# Patient Record
Sex: MALE | Race: WHITE | NOT HISPANIC OR LATINO | URBAN - METROPOLITAN AREA
[De-identification: names, ages, dates, MRNs, and addresses within clinical notes are randomized per-mention and may not be internally consistent; named-entity substitution may affect disease eponyms.]

---
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
## 2022-02-20 ENCOUNTER — INPATIENT (INPATIENT)
Facility: HOSPITAL | Age: 22
LOS: 2 days | Discharge: HOME | End: 2022-02-23
Attending: SPECIALIST | Admitting: SPECIALIST
Payer: MEDICAID

## 2022-02-20 VITALS
DIASTOLIC BLOOD PRESSURE: 62 MMHG | RESPIRATION RATE: 18 BRPM | OXYGEN SATURATION: 98 % | HEART RATE: 121 BPM | SYSTOLIC BLOOD PRESSURE: 144 MMHG

## 2022-02-20 PROBLEM — F84.0 AUTISTIC DISORDER: Chronic | Status: ACTIVE | Noted: 2019-05-18

## 2022-02-20 LAB
ALBUMIN SERPL ELPH-MCNC: 4.8 G/DL — SIGNIFICANT CHANGE UP (ref 3.5–5.2)
ALP SERPL-CCNC: 80 U/L — SIGNIFICANT CHANGE UP (ref 30–115)
ALT FLD-CCNC: 47 U/L — HIGH (ref 13–38)
ANION GAP SERPL CALC-SCNC: 14 MMOL/L — SIGNIFICANT CHANGE UP (ref 7–14)
AST SERPL-CCNC: 27 U/L — SIGNIFICANT CHANGE UP (ref 13–38)
BASOPHILS # BLD AUTO: 0.05 K/UL — SIGNIFICANT CHANGE UP (ref 0–0.2)
BASOPHILS NFR BLD AUTO: 0.4 % — SIGNIFICANT CHANGE UP (ref 0–1)
BILIRUB SERPL-MCNC: 0.3 MG/DL — SIGNIFICANT CHANGE UP (ref 0.2–1.2)
BUN SERPL-MCNC: 14 MG/DL — SIGNIFICANT CHANGE UP (ref 10–20)
CALCIUM SERPL-MCNC: 9.4 MG/DL — SIGNIFICANT CHANGE UP (ref 8.5–10.1)
CHLORIDE SERPL-SCNC: 105 MMOL/L — SIGNIFICANT CHANGE UP (ref 98–110)
CO2 SERPL-SCNC: 19 MMOL/L — SIGNIFICANT CHANGE UP (ref 17–32)
CREAT SERPL-MCNC: 0.7 MG/DL — SIGNIFICANT CHANGE UP (ref 0.3–1)
EOSINOPHIL # BLD AUTO: 0.21 K/UL — SIGNIFICANT CHANGE UP (ref 0–0.7)
EOSINOPHIL NFR BLD AUTO: 1.5 % — SIGNIFICANT CHANGE UP (ref 0–8)
GLUCOSE SERPL-MCNC: 109 MG/DL — HIGH (ref 70–99)
HCT VFR BLD CALC: 45.2 % — SIGNIFICANT CHANGE UP (ref 42–52)
HGB BLD-MCNC: 15.6 G/DL — SIGNIFICANT CHANGE UP (ref 14–18)
IMM GRANULOCYTES NFR BLD AUTO: 0.5 % — HIGH (ref 0.1–0.3)
LYMPHOCYTES # BLD AUTO: 1.57 K/UL — SIGNIFICANT CHANGE UP (ref 1.2–3.4)
LYMPHOCYTES # BLD AUTO: 11.3 % — LOW (ref 20.5–51.1)
MAGNESIUM SERPL-MCNC: 2.1 MG/DL — SIGNIFICANT CHANGE UP (ref 1.8–2.4)
MCHC RBC-ENTMCNC: 28.6 PG — SIGNIFICANT CHANGE UP (ref 27–31)
MCHC RBC-ENTMCNC: 34.5 G/DL — SIGNIFICANT CHANGE UP (ref 32–37)
MCV RBC AUTO: 82.8 FL — SIGNIFICANT CHANGE UP (ref 80–94)
MONOCYTES # BLD AUTO: 0.94 K/UL — HIGH (ref 0.1–0.6)
MONOCYTES NFR BLD AUTO: 6.7 % — SIGNIFICANT CHANGE UP (ref 1.7–9.3)
NEUTROPHILS # BLD AUTO: 11.11 K/UL — HIGH (ref 1.4–6.5)
NEUTROPHILS NFR BLD AUTO: 79.6 % — HIGH (ref 42.2–75.2)
NRBC # BLD: 0 /100 WBCS — SIGNIFICANT CHANGE UP (ref 0–0)
PLATELET # BLD AUTO: 231 K/UL — SIGNIFICANT CHANGE UP (ref 130–400)
POTASSIUM SERPL-MCNC: 4.1 MMOL/L — SIGNIFICANT CHANGE UP (ref 3.5–5)
POTASSIUM SERPL-SCNC: 4.1 MMOL/L — SIGNIFICANT CHANGE UP (ref 3.5–5)
PROT SERPL-MCNC: 7 G/DL — SIGNIFICANT CHANGE UP (ref 6.1–8)
RBC # BLD: 5.46 M/UL — SIGNIFICANT CHANGE UP (ref 4.7–6.1)
RBC # FLD: 12.3 % — SIGNIFICANT CHANGE UP (ref 11.5–14.5)
SARS-COV-2 RNA SPEC QL NAA+PROBE: SIGNIFICANT CHANGE UP
SODIUM SERPL-SCNC: 138 MMOL/L — SIGNIFICANT CHANGE UP (ref 135–146)
WBC # BLD: 13.95 K/UL — HIGH (ref 4.8–10.8)
WBC # FLD AUTO: 13.95 K/UL — HIGH (ref 4.8–10.8)

## 2022-02-20 PROCEDURE — 93010 ELECTROCARDIOGRAM REPORT: CPT

## 2022-02-20 PROCEDURE — 99285 EMERGENCY DEPT VISIT HI MDM: CPT

## 2022-02-20 RX ORDER — SODIUM CHLORIDE 9 MG/ML
3 INJECTION INTRAMUSCULAR; INTRAVENOUS; SUBCUTANEOUS EVERY 8 HOURS
Refills: 0 | Status: DISCONTINUED | OUTPATIENT
Start: 2022-02-20 | End: 2022-02-23

## 2022-02-20 RX ADMIN — SODIUM CHLORIDE 3 MILLILITER(S): 9 INJECTION INTRAMUSCULAR; INTRAVENOUS; SUBCUTANEOUS at 22:30

## 2022-02-20 NOTE — ED PROVIDER NOTE - PROGRESS NOTE DETAILS
Spoke with Dr. Go who agrees with the plan to admit the patient for further evaluation. Spoke with peds resident who is aware of this patient

## 2022-02-20 NOTE — H&P PEDIATRIC - NSHPLABSRESULTS_GEN_ALL_CORE
Labs:  CBC Full  -  ( 20 Feb 2022 19:00 )  WBC Count : 13.95 K/uL  RBC Count : 5.46 M/uL  Hemoglobin : 15.6 g/dL  Hematocrit : 45.2 %  Platelet Count - Automated : 231 K/uL  Mean Cell Volume : 82.8 fL  Mean Cell Hemoglobin : 28.6 pg  Mean Cell Hemoglobin Concentration : 34.5 g/dL  Auto Neutrophil # : 11.11 K/uL  Auto Lymphocyte # : 1.57 K/uL  Auto Monocyte # : 0.94 K/uL  Auto Eosinophil # : 0.21 K/uL  Auto Basophil # : 0.05 K/uL  Auto Neutrophil % : 79.6 %  Auto Lymphocyte % : 11.3 %  Auto Monocyte % : 6.7 %  Auto Eosinophil % : 1.5 %  Auto Basophil % : 0.4 %      02-20    138  |  105  |  14  ----------------------------<  109<H>  4.1   |  19  |  0.7    Ca    9.4      20 Feb 2022 19:00  Mg     2.1     02-20    TPro  7.0  /  Alb  4.8  /  TBili  0.3  /  DBili  x   /  AST  27  /  ALT  47<H>  /  AlkPhos  80  02-20    LIVER FUNCTIONS - ( 20 Feb 2022 19:00 )  Alb: 4.8 g/dL / Pro: 7.0 g/dL / ALK PHOS: 80 U/L / ALT: 47 U/L / AST: 27 U/L / GGT: x

## 2022-02-20 NOTE — ED PEDIATRIC NURSE NOTE - LOW RISK FALLS INTERVENTIONS (SCORE 7-11)
Orientation to room/Environment clear of unused equipment, furniture's in place, clear of hazards/Patient and family education available to parents and patient/Document fall prevention teaching and include in plan of care

## 2022-02-20 NOTE — H&P PEDIATRIC - ASSESSMENT
21 year old M with ASD and 1 prior seizure episode in May 2019, presenting with tonic clonic seizure-like activity, admitted for VEEG to rule out seizures. Patient has stable vitals. PE unremarkable. Patient is at baseline, minimally verbal. Labs reveal mild leukocytosis, secondary to seizure activity. Will admit for VEEG to assess for source of seizure activity.     Plan:   RESP:  RA    CVS:  Stable    FENGI:  Regular diet  IV lock and flush    ID:  COVID neg    NEURO:  VEEG  Ativan 2 mg IV PRN for seizures > 5 min  seizure precautions 21 year old M with ASD and 1 prior seizure episode in May 2019, presenting with tonic clonic seizure-like activity, admitted for VEEG to rule out seizures. Patient has stable vitals. PE unremarkable. Patient is at baseline, minimally verbal. Labs reveal mild leukocytosis likely secondary to seizure activity. Will admit for VEEG to assess for source of seizure activity.     Plan:   RESP:  RA    CVS:  Stable    FENGI:  Regular diet  IV lock and flush    ID:  COVID neg    NEURO:  VEEG  Ativan 2 mg IV PRN for seizures > 5 min  seizure precautions

## 2022-02-20 NOTE — ED PEDIATRIC TRIAGE NOTE - CHIEF COMPLAINT QUOTE
Patient BIBEMS for witnessed seizure at home, as per father seizure lasted 3 mins and he administered PO 1 MG Klonopin, no active seizures noted. Patient nonverbal at baseline. Denies head trauma or injury. Patient has had one prior seizure in 2019.

## 2022-02-20 NOTE — PATIENT PROFILE PEDIATRIC - ALCOHOL USE HISTORY SINGLE SELECT
Left message for patient to return call.   Future lab orders and US order placed.    Levothyroxine order awaiting pharmacy choice once patient returns call.    never

## 2022-02-20 NOTE — ED PROVIDER NOTE - NS ED ROS FT
Constitutional: Negative for fever, chills, and fatigue.  HENT: Negative for headache  Cardiovascular: Negative for chest pain, and palpitation.  Respiratory: Negative for SOB, wheezing, cough and sputum production.  Gastrointestinal: Negative for nausea, vomiting, abdominal pain, constipation, diarrhea, hematochezia, and melena.  Neurological: + seizure. Negative for dizziness  Musculoskeletal: Negative for joint swelling, arthralgias, back pain, neck pain, and calf cramps.

## 2022-02-20 NOTE — ED PROVIDER NOTE - OBJECTIVE STATEMENT
22 y/o male with hx of autism and seizure activity who presents with seizure activity. Pt had seizure in June 2018 and was seen by a neurologist at [facility] and had series of EEP which were unremarkable. Pt was not placed on any seizure meds and father was given Klonopin for break through seizure. Pt never had seizure again until today. Per father, pt was playing computer games and started having convulsive seizure. Dad gave Klonopin and seizure stopped shortly. Per dad, pt seized for a total of 3 mins. Denies recent sickness, head trauma, loss of bladder or bowel control, trauma during seizure, tongue biting, N/V, abdominal pain, and urinary symptoms.

## 2022-02-20 NOTE — ED PROVIDER NOTE - ATTENDING CONTRIBUTION TO CARE
21-year-old male past medical history of autism nonverbal but follows commands, seizure in 2018 but not currently on medication, presents with seizure.  Witnessed by father who is at the bedside.  Patient developed stiffness shaking to entire body jerking lasted approximately 3 minutes, resolved when father gave 1 mg clonazepam oral disintegrating tablet.  Patient is now sleepy but awake.  No recent illness nausea vomiting fever cough.  Has been tolerating p.o. no recent trauma.  Was never started on antiepileptic medications follows with [facility] for neurology, has had EEGs in the past and negative head CT in 2018.    On exam, AFVSS, Well appearing, No acute distress, NCAT, EOMI, PERRLA, MMM, Neck supple, LCTAB, RRR nl s1s2 No mrg, Abdomen Soft NTND, alert follows commands, moving all extremities cranial nerves intact, no Focal Deficits, No LE edema or calf TTP,    A/P; status post seizure, initially postictal but now back to baseline, will get fingerstick EKG and neurology consult

## 2022-02-20 NOTE — ED PROVIDER NOTE - CLINICAL SUMMARY MEDICAL DECISION MAKING FREE TEXT BOX
21-year-old male with a history of autism and seizure history presents to the ED with an episode of seizure.  Patient was evaluated by the initial team and case was signed out to me pending final disposition to neurology.  Unremarkable physical exam.  ANO x2 which is baseline.  Labs reviewed by me, values noted to be within normal limits.  Admitted to neurology.

## 2022-02-20 NOTE — ED PROVIDER NOTE - PHYSICAL EXAMINATION
CONSTITUTIONAL: In no apparent distress.   HEAD: Normocephalic; atraumatic.   EYES: Pupils are round and reactive, extra-ocular muscles are intact. Eyelids are normal in appearance without swelling or lesions.   ENT: Hearing is intact with good acuity to spoken voice.    RESPIRATORY: No signs of respiratory distress. Lung sounds are clear in all lobes bilaterally without rales, rhonchi, or wheezes.  CARDIOVASCULAR: Regular rate and rhythm.   GI: Abdomen is soft, non-tender, and without distention. Bowel sounds are present and normoactive in all four quadrants. No masses are noted.   BACK: No evidence of trauma or deformity. No midline tenderness. Normal ROM.   EXT: Normal appearance and ROM in all four extremities. No tenderness to palpation and distal pulses are normal. Sensation to the upper and lower extremities is normal bilaterally.   NEURO: A & O x 2. Visual fields are full to confrontation. Pupils are equally reactive to light. Extraocular movement is intact. There is no eye deviation. Facial sensation is intact to light touch. Face is symmetric with normal eye closure and smile. Hearing is normal to spoken voice. Phonation is normal.

## 2022-02-20 NOTE — H&P PEDIATRIC - HISTORY OF PRESENT ILLNESS
STEWART ARGUELLO    HPI.     Review of Systems  Constitutional: (-) fever (-) weakness (-) diaphoresis (-) pain  Eyes: (-) change in vision (-) photophobia (-) eye pain  ENT: (-) sore throat (-) ear pain  (-) nasal discharge (-) congestion  Cardiovascular: (-) chest pain (-) palpitations  Respiratory: (-) SOB (-) cough (-) WOB   GI: (-) abdominal pain (-) nausea (-) vomiting (-) diarrhea (-) constipation  : (-) dysuria (-) hematuria (-) increased frequency (-) increased urgency  Integumentary: (-) rash (-) redness (-) joint pain (-) MSK pain (-) swelling  Neurological:  (-) focal deficit (-) altered mental status (-) dizziness  General: (-) recent travel (-) sick contacts (-) decreased PO (-) urine output     PMHx:   PSHx:   Meds:   All: NKDA   FHx:   SHx:   HEADSSS: ---- For Adolescent Pt   - Home:   - Education/Employment:  - Activities:  - Drugs:  - Sexuality:  - Suicide/Depression:  - Safety:  BHx: FT, , no NICU stay, no complications  DHx: developmentally appropriate, rising ___ grader, academically performing well. ST/OT/PT  PMD:   Vaccines:   Rx:     ED Course: Fluids and Meds, Labs, Imaging, Consults    Vital Signs Last 24 Hrs  T(C): 37 (2022 21:09), Max: 37 (2022 17:56)  T(F): 98.6 (2022 21:09), Max: 98.6 (2022 17:56)  HR: 96 (2022 21:09) (96 - 121)  BP: 119/59 (2022 21:09) (119/59 - 144/62)  BP(mean): 83 (2022 21:09) (83 - 83)  RR: 19 (2022 21:09) (18 - 19)  SpO2: 98% (2022 21:09) (98% - 99%)    I&O's Summary      Drug Dosing Weight      Physical Exam:  GENERAL: well-appearing, well nourished, no acute distress, AOx3  HEENT: NCAT, conjunctiva clear and not injected, sclera non-icteric, PERRLA, EACs clear, TMs nonbulging/nonerythematous, nares patent, mucous membranes moist, no mucosal lesions, pharynx nonerythematous, no tonsillar hypertrophy or exudate, neck supple, no cervical lymphadenopathy  HEART: RRR, S1, S2, no rubs, murmurs, or gallops, RP/DP present, cap refill <2 seconds  LUNG: CTAB, no wheezing, no ronchi, no crackles, no retractions, no belly breathing, no tachypnea  ABDOMEN: +BS, soft, nontender, nondistended, no hepatomegaly, no splenomegaly, no hernia  NEURO/MSK: grossly intact  NEURO: CNII-XII grossly intact, EOMI, no dysmetria, DTRs normal b/l, no ataxia, sensation intact to light touch, negative Babinski  MUSCULOSKELETAL: passive and active ROM intact, 5/5 strength upper extremities  SKIN: good turgor, no rash, no bruising or prominent lesions                         STEWART ARGUELLO    21 year old M with ASD and 1 prior seizure episode in May 2019, presenting with tonic-clonic seizure-like activity. This afternoon, the patient was sitting at his computer when he let out a scream. Father witnessed patient arching his back and tensing up. His arms were flexed at the elbow. He then began to have full body shaking, although father said he could not see his legs since he was sitting. He had foaming at the mouth. Denies head trauma, tongue-biting, incontinence or defecation. The seizure lasted approximately 3 minutes. Father gave 1 mg of Clonazepam which he had at home, since the prior seizure. Patient returned to baseline after 10-15 minutes, apart from some grogginess. Patient had prior seizure in May 2019 where he fell from his chair. Father thought the patient was choking at the time and had put his finger in the patient's mouth; patient bit down on father's finger. He also tensed up and then had full body shaking during the prior episode. Patient had a longer post ictal state in the prior episode than this time. Since the prior seizure episode, patient was followed by Neurologist Dr. Lama at [facility]; last seen 2021. He has had multiple prior EEGs that were normal with [facility]. Denies recent URI, fever or sick contacts.     PMHx: ASD, 1 prior seizure episode in May 2019  PSHx: Circumcision  Meds: Clonazepam 1 mg PRN for seizures > 3 min  All: NKDA   FHx: 2 brothers have Autism  SHx: Lives with parents and 2 brothers. Father smokes cigars. No pets.   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   DHx: Autism Spectrum Disorder. FRANCO/ST/OT/PT  PMD: Dr. Ned Gonzalez  Vaccines: UTD including COVID+booster; no flu this year    ED Course: CBC, CMP, COVID PCR, Magnesium, POCT glucose                 STEWART ARGUELLO    21 year old M with ASD and 1 prior seizure episode in May 2019, presenting with tonic-clonic seizure-like activity. This afternoon, the patient was sitting at his computer when he let out a scream. Father witnessed patient arching his back and tensing up. His arms were flexed at the elbow. He then began to have full body shaking, although father said he could not see his legs since he was sitting. He had foaming at the mouth. Denies head trauma, tongue-biting, incontinence or defecation. The seizure lasted approximately 3 minutes. Father gave 1 mg of Clonazepam which was prescribed as rescue treatment following the prior seizure. Patient returned to baseline after 10-15 minutes, apart from some grogginess. Patient had prior seizure in May 2019 where he fell from his chair. Father thought the patient was choking at the time and had put his finger in the patient's mouth; patient bit down on father's finger. He also tensed up and then had full body shaking during the prior episode. Patient had a longer post ictal state in the prior episode than this time. VEEG at that time done at [facility] was normal overnight. Patient was followed by Neurologist Dr. Lama at [facility]; last seen 2021. Subsequent EEGs were normal with [facility].     ROS: Denies recent URI, fever or sick contacts.     PMHx: ASD, 1 prior seizure episode in May 2019  PSHx: Circumcision  Meds: Clonazepam 1 mg PRN for seizures > 3 min  All: NKDA   FHx: 2 brothers have Autism  SHx: Lives with parents and 2 brothers. Father smokes cigars. No pets.   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   DHx: Autism Spectrum Disorder. FRANCO/ST/OT/PT  PMD: Dr. Ned Gonzalez  Vaccines: UTD including COVID+booster; no flu this year    ED Course: CBC, CMP, COVID PCR, Magnesium, POCT glucose

## 2022-02-20 NOTE — H&P PEDIATRIC - NSHPPHYSICALEXAM_GEN_ALL_CORE
Vital Signs Last 24 Hrs  T(C): 37 (20 Feb 2022 21:09), Max: 37 (20 Feb 2022 17:56)  T(F): 98.6 (20 Feb 2022 21:09), Max: 98.6 (20 Feb 2022 17:56)  HR: 96 (20 Feb 2022 21:09) (96 - 121)  BP: 119/59 (20 Feb 2022 21:09) (119/59 - 144/62)  BP(mean): 83 (20 Feb 2022 21:09) (83 - 83)  RR: 19 (20 Feb 2022 21:09) (18 - 19)  SpO2: 98% (20 Feb 2022 21:09) (98% - 99%)    Physical Exam:  GENERAL: well-appearing, well nourished, no acute distress, minimally verbal  HEENT: NCAT, conjunctiva clear and not injected, sclera non-icteric, PERRLA, EACs clear  HEART: RRR, S1, S2, no rubs, murmurs, or gallops, RP/DP present, cap refill <2 seconds  LUNG: CTAB, no wheezing, no ronchi, no crackles, no retractions, no belly breathing, no tachypnea  ABDOMEN: soft, nondistended  NEURO/MSK: grossly intact  NEURO: EOMI; unable to assess sensation as patient is minimally verbal and autistic  MUSCULOSKELETAL: passive and active ROM intact, 5/5 strength upper extremities  SKIN: good turgor, no rash, no bruising or prominent lesions Vital Signs Last 24 Hrs  T(C): 37 (20 Feb 2022 21:09), Max: 37 (20 Feb 2022 17:56)  T(F): 98.6 (20 Feb 2022 21:09), Max: 98.6 (20 Feb 2022 17:56)  HR: 96 (20 Feb 2022 21:09) (96 - 121)  BP: 119/59 (20 Feb 2022 21:09) (119/59 - 144/62)  BP(mean): 83 (20 Feb 2022 21:09) (83 - 83)  RR: 19 (20 Feb 2022 21:09) (18 - 19)  SpO2: 98% (20 Feb 2022 21:09) (98% - 99%)    Physical Exam:  GENERAL: well-appearing, well nourished, no acute distress, minimally verbal  HEENT: NCAT, conjunctiva clear and not injected, sclera non-icteric, PERRLA, EACs clear  HEART: RRR, S1, S2, no rubs, murmurs, or gallops, RP/DP present, cap refill <2 seconds  LUNG: CTAB, no wheezing, no ronchi, no crackles, no retractions, no belly breathing, no tachypnea  ABDOMEN: soft, nondistended  NEURO: CN II-XII in tact. No nystagmus  MUSCULOSKELETAL: passive and active ROM intact, 5/5 strength upper extremities  SKIN: good turgor, no rash, no bruising or prominent lesions

## 2022-02-21 PROCEDURE — 99222 1ST HOSP IP/OBS MODERATE 55: CPT

## 2022-02-21 RX ADMIN — SODIUM CHLORIDE 3 MILLILITER(S): 9 INJECTION INTRAMUSCULAR; INTRAVENOUS; SUBCUTANEOUS at 22:00

## 2022-02-21 RX ADMIN — SODIUM CHLORIDE 3 MILLILITER(S): 9 INJECTION INTRAMUSCULAR; INTRAVENOUS; SUBCUTANEOUS at 05:10

## 2022-02-21 RX ADMIN — SODIUM CHLORIDE 3 MILLILITER(S): 9 INJECTION INTRAMUSCULAR; INTRAVENOUS; SUBCUTANEOUS at 13:21

## 2022-02-21 NOTE — CONSULT NOTE PEDS - SUBJECTIVE AND OBJECTIVE BOX
21 year old M  one of the triplets that admitted for a witnessed GTC seizure.  One of the triplets is her sister with normal development. The other boy of the same triplet pregnancy and also the boy born a year later are also carrying the diagnosis of Autistic spectrum disorder .  Mitul was diagnosed from age [age] years. He was under the care of Oliverio Teixeira .  He did have  one  prior seizure episode in May 2019, presenting with tonic-clonic seizure-like activity.  After that he was seen in [facility] by Albin Teixeira. According   to Dad three sessions of V-EEG were all normal. He did not have MRI and he was also not started on AED.  This event happened in the  afternoon, the patient was sitting at his computer when he let out a scream. Father witnessed patient arching / having flexion of both hands and eyes rolling up. No head or eye turn. This was followed by clonic activity lasting for about two minutes. It took him about 10 minutes to go back to the baseline  . Dad denies seeing any post ictal neurological deficit . He had foaming at the mouth. Denies head trauma, tongue-biting, incontinence or defecation.   Prior to the event he was at his usual daily activities. His sleep is described as good   At baseline he does have very limited vocabulary He participates in different programs and his behavior is described as calm. He is not having any episodic events in sleep or wakefulness        Physical Exam: Vital Signs Last 24 Hrs  T(C): 37 (20 Feb 2022 21:09), Max: 37 (20 Feb 2022 17:56)  T(F): 98.6 (20 Feb 2022 21:09), Max: 98.6 (20 Feb 2022 17:56)  HR: 96 (20 Feb 2022 21:09) (96 - 121)  BP: 119/59 (20 Feb 2022 21:09) (119/59 - 144/62)  BP(mean): 83 (20 Feb 2022 21:09) (83 - 83)  RR: 19 (20 Feb 2022 21:09) (18 - 19)  SpO2: 98% (20 Feb 2022 21:09) (98% - 99%)    Physical Exam:  GENERAL: well-appearing, well nourished, no acute distress, minimally verbal  HEENT: NCAT, conjunctiva clear and not injected, sclera non-icteric, PERRLA, EACs clear  HEART: RRR, S1, S2, no rubs, murmurs, or gallops, RP/DP present, cap refill <2 seconds  LUNG: CTAB, no wheezing, no ronchi, no crackles, no retractions, no belly breathing, no tachypnea  ABDOMEN: soft, nondistended  NEURO   decreased eye contact, playing with his computer, having some self stim behavior such as flapping the hands . Full ROM of EOM. Moves extermities symmetrically    MUSCULOSKELETAL: passive and active ROM intact, 5/5 strength upper extremities  SKIN: good turgor, no rash, no bruising or prominent lesions

## 2022-02-21 NOTE — CONSULT NOTE PEDS - ASSESSMENT
1- Autistic spectrum D/O ( no clear defined etiology for the syndrome )  2- two episdes of GTC seizure since 2019      Plan  V-EEG  will consider MRI  3- no AED  4- seizure precaution

## 2022-02-21 NOTE — PROGRESS NOTE PEDS - SUBJECTIVE AND OBJECTIVE BOX
Patient is a 21y old  Male who presents with a chief complaint of seizure-like activity (20 Feb 2022 21:28)      INTERVAL/OVERNIGHT EVENTS: Patient seen and examined at bedside. No acute overnight events. Patient is voiding and stooling adequately.    REVIEW OF SYSTEMS:   CONSTITUTIONAL: No fevers, no chills, no irritability, no decrease in activity.  HEAD: No headache  EYES/ENT: No eye discharge, no throat pain, no nasal congestion, no rhinorrhea, no otalgia.  NECK: No pain, no stiffness  RESPIRATORY: No cough, no wheezing, no increase work of breathing, no shortness of breath.  CARDIOVASCULAR: No chest pain, no palpitations.  GASTROINTESTINAL: No abdominal pain. No nausea, no vomiting. No diarrhea, no constipation. No decrease appetite. No hematemesis. No melena or hematochezia.  GENITOURINARY: No dysuria, frequency or hematuria.   NEUROLOGICAL: No numbness, no weakness.  SKIN: No itching, no rash.    VITALS, INTAKE/OUTPUT:  Vital Signs Last 24 Hrs  T(C): 36.4 (20 Feb 2022 22:33), Max: 37 (20 Feb 2022 17:56)  T(F): 97.5 (20 Feb 2022 22:33), Max: 98.6 (20 Feb 2022 17:56)  HR: 86 (20 Feb 2022 22:33) (86 - 121)  BP: 129/72 (20 Feb 2022 22:33) (119/59 - 144/62)  BP(mean): 83 (20 Feb 2022 21:09) (83 - 83)  RR: 24 (20 Feb 2022 22:33) (18 - 24)  SpO2: 98% (20 Feb 2022 22:33) (98% - 99%)  Daily     Daily   I&O's Summary      PHYSICAL EXAM:  Gen: No acute distress; interactive, well appearing  HEENT: NC/AT; no conjunctivitis or scleral icterus; no nasal discharge; oropharynx without exudates/erythema; mucus membranes moist  Neck: Supple, no cervical lymphadenopathy  Chest: CTA b/l, no crackles/wheezes, no tachypnea or retractions. Cap refill < 2 seconds  CV: RRR, no m/r/g  Abd: Normoactive bowel sounds, soft, nondistended, nontender, no hepatosplenomegaly  Extrem: No deformities, edema or erythema noted.  WWP  Neuro: Grossly nonfocal, strength and tone grossly normal,     INTERVAL LAB RESULTS:                        15.6   13.95 )-----------( 231      ( 20 Feb 2022 19:00 )             45.2                               138    |  105    |  14                  Calcium: 9.4   / iCa: x      (02-20 @ 19:00)    ----------------------------<  109       Magnesium: 2.1                              4.1     |  19     |  0.7              Phosphorous: x        TPro  7.0    /  Alb  4.8    /  TBili  0.3    /  DBili  x      /  AST  27     /  ALT  47     /  AlkPhos  80     20 Feb 2022 19:00      UCx   I&O's Summary      Medications and Allergies:  MEDICATIONS  (STANDING):  sodium chloride 0.9% lock flush 3 milliLiter(s) IV Push every 8 hours    MEDICATIONS  (PRN):  LORazepam IV Push - Peds 2 milliGRAM(s) IV Push once PRN seizure > 5 min    Allergies    No Known Allergies    Intolerances        Assessment:    Plan: Patient is a 21y old  Male who presents with a chief complaint of seizure-like activity (20 Feb 2022 21:28)      INTERVAL/OVERNIGHT EVENTS: Patient seen and examined at bedside. No acute overnight events. Patient is voiding and stooling adequately.    REVIEW OF SYSTEMS:   CONSTITUTIONAL: No fevers, no chills, no irritability, no decrease in activity.  HEAD: No headache  EYES/ENT: No eye discharge, no throat pain, no nasal congestion, no rhinorrhea, no otalgia.  NECK: No pain, no stiffness  RESPIRATORY: No cough, no wheezing, no increase work of breathing, no shortness of breath.  CARDIOVASCULAR: No chest pain, no palpitations.  GASTROINTESTINAL: No abdominal pain. No nausea, no vomiting. No diarrhea, no constipation. No decrease appetite. No hematemesis. No melena or hematochezia.  GENITOURINARY: No dysuria, frequency or hematuria.   NEUROLOGICAL: No numbness, no weakness.  SKIN: No itching, no rash.    VITALS, INTAKE/OUTPUT:  Vital Signs Last 24 Hrs  T(C): 36.4 (20 Feb 2022 22:33), Max: 37 (20 Feb 2022 17:56)  T(F): 97.5 (20 Feb 2022 22:33), Max: 98.6 (20 Feb 2022 17:56)  HR: 86 (20 Feb 2022 22:33) (86 - 121)  BP: 129/72 (20 Feb 2022 22:33) (119/59 - 144/62)  BP(mean): 83 (20 Feb 2022 21:09) (83 - 83)  RR: 24 (20 Feb 2022 22:33) (18 - 24)  SpO2: 98% (20 Feb 2022 22:33) (98% - 99%)  Daily     Daily   I&O's Summary      PHYSICAL EXAM:  Gen: No acute distress; interactive, well appearing  HEENT: NC/AT; no conjunctivitis or scleral icterus; no nasal discharge; oropharynx without exudates/erythema; mucus membranes moist  Neck: Supple, no cervical lymphadenopathy  Chest: CTA b/l, no crackles/wheezes, no tachypnea or retractions. Cap refill < 2 seconds  CV: RRR, no m/r/g  Abd: Normoactive bowel sounds, soft, nondistended, nontender, no hepatosplenomegaly  Extrem: No deformities, edema or erythema noted.      INTERVAL LAB RESULTS:                        15.6   13.95 )-----------( 231      ( 20 Feb 2022 19:00 )             45.2                               138    |  105    |  14                  Calcium: 9.4   / iCa: x      (02-20 @ 19:00)    ----------------------------<  109       Magnesium: 2.1                              4.1     |  19     |  0.7              Phosphorous: x        TPro  7.0    /  Alb  4.8    /  TBili  0.3    /  DBili  x      /  AST  27     /  ALT  47     /  AlkPhos  80     20 Feb 2022 19:00      UCx   I&O's Summary      Medications and Allergies:  MEDICATIONS  (STANDING):  sodium chloride 0.9% lock flush 3 milliLiter(s) IV Push every 8 hours    MEDICATIONS  (PRN):  LORazepam IV Push - Peds 2 milliGRAM(s) IV Push once PRN seizure > 5 min    Allergies    No Known Allergies    Intolerances

## 2022-02-22 PROCEDURE — 99232 SBSQ HOSP IP/OBS MODERATE 35: CPT

## 2022-02-22 PROCEDURE — 95720 EEG PHY/QHP EA INCR W/VEEG: CPT

## 2022-02-22 RX ADMIN — SODIUM CHLORIDE 3 MILLILITER(S): 9 INJECTION INTRAMUSCULAR; INTRAVENOUS; SUBCUTANEOUS at 22:31

## 2022-02-22 RX ADMIN — SODIUM CHLORIDE 3 MILLILITER(S): 9 INJECTION INTRAMUSCULAR; INTRAVENOUS; SUBCUTANEOUS at 06:38

## 2022-02-22 RX ADMIN — SODIUM CHLORIDE 3 MILLILITER(S): 9 INJECTION INTRAMUSCULAR; INTRAVENOUS; SUBCUTANEOUS at 13:28

## 2022-02-22 NOTE — PROGRESS NOTE PEDS - SUBJECTIVE AND OBJECTIVE BOX
[id number]  STEWART ARGUELLO  21y    Male    Allergies: No Known Allergies      Medications: LORazepam IV Push - Peds 2 milliGRAM(s) IV Push once PRN  sodium chloride 0.9% lock flush 3 milliLiter(s) IV Push every 8 hours      T(C): 36.3 (02-22-22 @ 07:40), Max: 37.2 (02-21-22 @ 15:40)  HR: 81 (02-22-22 @ 07:40) (76 - 89)  BP: 114/64 (02-22-22 @ 07:40) (90/54 - 120/62)  RR: 20 (02-22-22 @ 07:40) (20 - 20)  SpO2: 97% (02-22-22 @ 07:40) (96% - 100%)    Did well overnight. No reported events.  VEEG shows normal background. Full report in system    PHYSICAL EXAM:    Awake, in good spirits. Follows single step directions well.    Neurological: CN II-XII in tact. No nystagmus. Motor full vigorous movement throughout

## 2022-02-22 NOTE — PROGRESS NOTE PEDS - SUBJECTIVE AND OBJECTIVE BOX
MEDICATIONS  (STANDING):  sodium chloride 0.9% lock flush 3 milliLiter(s) IV Push every 8 hours    MEDICATIONS  (PRN):  LORazepam IV Push - Peds 2 milliGRAM(s) IV Push once PRN seizure > 5 min      Allergies:  No Known Allergies      Vital Signs Last 24 Hrs  T(C): 36.3 (22 Feb 2022 07:40), Max: 37.2 (21 Feb 2022 15:40)  T(F): 97.3 (22 Feb 2022 07:40), Max: 98.9 (21 Feb 2022 15:40)  HR: 81 (22 Feb 2022 07:40) (76 - 89)  BP: 114/64 (22 Feb 2022 07:40) (90/54 - 120/62)  BP(mean): --  RR: 20 (22 Feb 2022 07:40) (20 - 20)  SpO2: 97% (22 Feb 2022 07:40) (96% - 98%)      Physical Exam:  Constitutional: No acute distress, well appearing, alert and active, Nonverbal.   Eyes: PERRLA, no conjunctival injection, no eye discharge, EOMI  Respiratory: Not in respiratory distress.   Cardiovascular: S1, S2, no murmur, RRR  Gastrointestinal: Bowel sounds positive, Soft, nondistended, nontender  Skin: No rash      Labs:    CBC Full  -  ( 20 Feb 2022 19:00 )  WBC Count : 13.95 K/uL  RBC Count : 5.46 M/uL  Hemoglobin : 15.6 g/dL  Hematocrit : 45.2 %  Platelet Count - Automated : 231 K/uL  Mean Cell Volume : 82.8 fL  Mean Cell Hemoglobin : 28.6 pg  Mean Cell Hemoglobin Concentration : 34.5 g/dL  Auto Neutrophil # : 11.11 K/uL  Auto Lymphocyte # : 1.57 K/uL  Auto Monocyte # : 0.94 K/uL  Auto Eosinophil # : 0.21 K/uL  Auto Basophil # : 0.05 K/uL  Auto Neutrophil % : 79.6 %  Auto Lymphocyte % : 11.3 %  Auto Monocyte % : 6.7 %  Auto Eosinophil % : 1.5 %  Auto Basophil % : 0.4 %      02-20    138  |  105  |  14  ----------------------------<  109<H>  4.1   |  19  |  0.7    Ca    9.4      20 Feb 2022 19:00  Mg     2.1     02-20    TPro  7.0  /  Alb  4.8  /  TBili  0.3  /  DBili  x   /  AST  27  /  ALT  47<H>  /  AlkPhos  80  02-20                                             Overnight, no acute events. No seizure-like activity. This morning, no acute complaints. Patient is eating at baseline.     MEDICATIONS  (STANDING):  sodium chloride 0.9% lock flush 3 milliLiter(s) IV Push every 8 hours    MEDICATIONS  (PRN):  LORazepam IV Push - Peds 2 milliGRAM(s) IV Push once PRN seizure > 5 min      Allergies:  No Known Allergies      Vital Signs Last 24 Hrs  T(C): 36.3 (22 Feb 2022 07:40), Max: 37.2 (21 Feb 2022 15:40)  T(F): 97.3 (22 Feb 2022 07:40), Max: 98.9 (21 Feb 2022 15:40)  HR: 81 (22 Feb 2022 07:40) (76 - 89)  BP: 114/64 (22 Feb 2022 07:40) (90/54 - 120/62)  BP(mean): --  RR: 20 (22 Feb 2022 07:40) (20 - 20)  SpO2: 97% (22 Feb 2022 07:40) (96% - 98%)      Physical Exam:  Constitutional: No acute distress, well appearing, alert and active, Nonverbal.   Eyes: PERRLA, no conjunctival injection, no eye discharge, EOMI  Respiratory: Not in respiratory distress.   Cardiovascular: Regular rate.   Gastrointestinal: nondistended  Skin: No rash  Neuro: Full ROM in upper and lower extremities.     Labs:    CBC Full  -  ( 20 Feb 2022 19:00 )  WBC Count : 13.95 K/uL  RBC Count : 5.46 M/uL  Hemoglobin : 15.6 g/dL  Hematocrit : 45.2 %  Platelet Count - Automated : 231 K/uL  Mean Cell Volume : 82.8 fL  Mean Cell Hemoglobin : 28.6 pg  Mean Cell Hemoglobin Concentration : 34.5 g/dL  Auto Neutrophil # : 11.11 K/uL  Auto Lymphocyte # : 1.57 K/uL  Auto Monocyte # : 0.94 K/uL  Auto Eosinophil # : 0.21 K/uL  Auto Basophil # : 0.05 K/uL  Auto Neutrophil % : 79.6 %  Auto Lymphocyte % : 11.3 %  Auto Monocyte % : 6.7 %  Auto Eosinophil % : 1.5 %  Auto Basophil % : 0.4 %      02-20    138  |  105  |  14  ----------------------------<  109<H>  4.1   |  19  |  0.7    Ca    9.4      20 Feb 2022 19:00  Mg     2.1     02-20    TPro  7.0  /  Alb  4.8  /  TBili  0.3  /  DBili  x   /  AST  27  /  ALT  47<H>  /  AlkPhos  80  02-20

## 2022-02-23 VITALS
OXYGEN SATURATION: 99 % | DIASTOLIC BLOOD PRESSURE: 57 MMHG | TEMPERATURE: 97 F | HEART RATE: 88 BPM | RESPIRATION RATE: 20 BRPM | SYSTOLIC BLOOD PRESSURE: 112 MMHG

## 2022-02-23 PROCEDURE — 95720 EEG PHY/QHP EA INCR W/VEEG: CPT

## 2022-02-23 PROCEDURE — 99231 SBSQ HOSP IP/OBS SF/LOW 25: CPT

## 2022-02-23 PROCEDURE — 70551 MRI BRAIN STEM W/O DYE: CPT | Mod: 26

## 2022-02-23 RX ORDER — SODIUM CHLORIDE 9 MG/ML
1000 INJECTION, SOLUTION INTRAVENOUS
Refills: 0 | Status: DISCONTINUED | OUTPATIENT
Start: 2022-02-23 | End: 2022-02-23

## 2022-02-23 RX ORDER — LEVETIRACETAM 250 MG/1
250 TABLET, FILM COATED ORAL EVERY 12 HOURS
Refills: 0 | Status: DISCONTINUED | OUTPATIENT
Start: 2022-02-23 | End: 2022-02-23

## 2022-02-23 RX ORDER — LEVETIRACETAM 250 MG/1
2.5 TABLET, FILM COATED ORAL
Qty: 150 | Refills: 0
Start: 2022-02-23 | End: 2022-03-24

## 2022-02-23 RX ADMIN — SODIUM CHLORIDE 3 MILLILITER(S): 9 INJECTION INTRAMUSCULAR; INTRAVENOUS; SUBCUTANEOUS at 06:52

## 2022-02-23 RX ADMIN — SODIUM CHLORIDE 100 MILLILITER(S): 9 INJECTION, SOLUTION INTRAVENOUS at 07:46

## 2022-02-23 RX ADMIN — LEVETIRACETAM 250 MILLIGRAM(S): 250 TABLET, FILM COATED ORAL at 10:59

## 2022-02-23 NOTE — DISCHARGE NOTE NURSING/CASE MANAGEMENT/SOCIAL WORK - NSDCPEFALRISK_GEN_ALL_CORE
For information on Fall & Injury Prevention, visit: [email or web address] OR  [email or web address] OR  https://www.cdc.gov/steadi/patient.html

## 2022-02-23 NOTE — DISCHARGE NOTE NURSING/CASE MANAGEMENT/SOCIAL WORK - PATIENT PORTAL LINK FT
You can access the FollowMyHealth Patient Portal offered by [facility] by registering at the following website: [email or web address]. By joining [facility]’s FollowMyHealth portal, you will also be able to view your health information using other applications (apps) compatible with our system.

## 2022-02-23 NOTE — DISCHARGE NOTE PROVIDER - PROVIDER TOKENS
PROVIDER:[TOKEN:[[id number]],FOLLOWUP:[2 weeks]],PROVIDER:[TOKEN:[[id number]],FOLLOWUP:[1-3 days]]

## 2022-02-23 NOTE — DISCHARGE NOTE PROVIDER - HOSPITAL COURSE
21 year old M with ASD and 1 prior seizure episode in May 2019, presenting with tonic-clonic seizure-like activity. This afternoon, the patient was sitting at his computer when he let out a scream. Father witnessed patient arching his back and tensing up. His arms were flexed at the elbow. He then began to have full body shaking, although father said he could not see his legs since he was sitting. He had foaming at the mouth. Denies head trauma, tongue-biting, incontinence or defecation. The seizure lasted approximately 3 minutes. Father gave 1 mg of Clonazepam which he had at home, since the prior seizure. Patient returned to baseline after 10-15 minutes, apart from some grogginess. Patient had prior seizure in May 2019 where he fell from his chair. Father thought the patient was choking at the time and had put his finger in the patient's mouth; patient bit down on father's finger. He also tensed up and then had full body shaking during the prior episode. Patient had a longer post ictal state in the prior episode than this time. Since the prior seizure episode, patient was followed by Neurologist Dr. Lama at [facility]; last seen February 2021. He has had multiple prior EEGs that were normal with [facility]. Denies recent URI, fever or sick contacts.     ED Course: CBC, CMP, COVID PCR, Magnesium, POCT glucose    Inpatient course (2/21-2/23)  Patient was admitted to the floor and started on vEEG. Ativan was ordered for breakthrough seizures, of which the patient required none. Video EEG was normal and patient was continued on VEEG for an additional night which showed no seizure activity. Patient was started on Keppra 250 mg BID. MR brain with sedation was performed which showed a signal hyperintensity in the right frontal periventricular white matter that is nonspecific. Patient's UOP, stooling and PO were baseline upon d/c.    Labs/Radiology:  Comprehensive Metabolic Panel (02.20.22 @ 19:00)    Sodium, Serum: 138 mmol/L    Potassium, Serum: 4.1 mmol/L    Chloride, Serum: 105 mmol/L    Carbon Dioxide, Serum: 19 mmol/L    Anion Gap, Serum: 14 mmol/L    Blood Urea Nitrogen, Serum: 14 mg/dL    Creatinine, Serum: 0.7 mg/dL    Glucose, Serum: 109 mg/dL    Calcium, Total Serum: 9.4 mg/dL    Protein Total, Serum: 7.0 g/dL    Albumin, Serum: 4.8 g/dL    Bilirubin Total, Serum: 0.3 mg/dL    Alkaline Phosphatase, Serum: 80 U/L    Aspartate Aminotransferase (AST/SGOT): 27 U/L    Alanine Aminotransferase (ALT/SGPT): 47 U/L    < from: MR Brain-Seizure, Epilepsy No Cont (02.23.22 @ 16:09) >    IMPRESSION:  A single focus of signal hyperintensity is noted within the right frontal   periventricular white matter which is nonspecific. Finding may represent   sequela of migraine headaches, chronic infection/inflammation or   demyelination. A postcontrast examination is recommended forfurther   evaluation.    Otherwise unremarkable noncontrast examination of the brain.    < end of copied text >    Discharge Vitals:  Vital Signs Last 24 Hrs  T(C): 36 (23 Feb 2022 11:30), Max: 36.9 (23 Feb 2022 07:50)  T(F): 96.8 (23 Feb 2022 11:30), Max: 98.4 (23 Feb 2022 07:50)  HR: 88 (23 Feb 2022 11:30) (84 - 107)  BP: 112/57 (23 Feb 2022 11:30) (97/51 - 112/57)  BP(mean): --  RR: 20 (23 Feb 2022 11:30) (20 - 20)  SpO2: 99% (23 Feb 2022 11:30) (95% - 99%)    Discharge Physical Exam:  Constitutional: No acute distress, well appearing, alert and active, Nonverbal.   Eyes: PERRLA, no conjunctival injection, no eye discharge, EOMI  Respiratory: Not in respiratory distress.   Cardiovascular: Regular rate.   Gastrointestinal: nondistended  Skin: No rash  Neuro: Full ROM in upper and lower extremities.     Discharge plan:  F/U with Primary doctor Dr. Gonzalez in 1-3 days  F/U with Neurologist Dr. Talamantes in 2 weeks.   Take Keppra 250 mg every 12 hours.   21 year old M with ASD and 1 prior seizure episode in May 2019, presenting with tonic-clonic seizure-like activity. This afternoon, the patient was sitting at his computer when he let out a scream. Father witnessed patient arching his back and tensing up. His arms were flexed at the elbow. He then began to have full body shaking, although father said he could not see his legs since he was sitting. He had foaming at the mouth. Denies head trauma, tongue-biting, incontinence or defecation. The seizure lasted approximately 3 minutes. Father gave 1 mg of Clonazepam which he had at home, since the prior seizure. Patient returned to baseline after 10-15 minutes, apart from some grogginess. Patient had prior seizure in May 2019 where he fell from his chair. Father thought the patient was choking at the time and had put his finger in the patient's mouth; patient bit down on father's finger. He also tensed up and then had full body shaking during the prior episode. Patient had a longer post ictal state in the prior episode than this time. Since the prior seizure episode, patient was followed by Neurologist Dr. Lama at [facility]; last seen February 2021. He has had multiple prior EEGs that were normal with [facility]. Denies recent URI, fever or sick contacts.     ED Course: CBC, CMP, COVID PCR, Magnesium, POCT glucose    Inpatient course (2/21-2/23)  Patient was admitted to the floor and started on vEEG. Ativan was ordered for breakthrough seizures, of which the patient required none. Video EEG was normal and patient was continued on VEEG for an additional night which showed no seizure activity. Patient was started on Keppra 250 mg BID. MR brain with sedation was performed which showed a signal hyperintensity in the right frontal periventricular white matter that is nonspecific. Patient's UOP, stooling and PO were baseline upon d/c.    Labs/Radiology:  Comprehensive Metabolic Panel (02.20.22 @ 19:00)    Sodium, Serum: 138 mmol/L    Potassium, Serum: 4.1 mmol/L    Chloride, Serum: 105 mmol/L    Carbon Dioxide, Serum: 19 mmol/L    Anion Gap, Serum: 14 mmol/L    Blood Urea Nitrogen, Serum: 14 mg/dL    Creatinine, Serum: 0.7 mg/dL    Glucose, Serum: 109 mg/dL    Calcium, Total Serum: 9.4 mg/dL    Protein Total, Serum: 7.0 g/dL    Albumin, Serum: 4.8 g/dL    Bilirubin Total, Serum: 0.3 mg/dL    Alkaline Phosphatase, Serum: 80 U/L    Aspartate Aminotransferase (AST/SGOT): 27 U/L    Alanine Aminotransferase (ALT/SGPT): 47 U/L    < from: MR Brain-Seizure, Epilepsy No Cont (02.23.22 @ 16:09) >    IMPRESSION:  A single focus of signal hyperintensity is noted within the right frontal   periventricular white matter which is nonspecific. Finding may represent   sequela of migraine headaches, chronic infection/inflammation or   demyelination. A postcontrast examination is recommended forfurther   evaluation.    Otherwise unremarkable noncontrast examination of the brain.    < end of copied text >    Discharge Vitals:  Vital Signs Last 24 Hrs  T(C): 36 (23 Feb 2022 11:30), Max: 36.9 (23 Feb 2022 07:50)  T(F): 96.8 (23 Feb 2022 11:30), Max: 98.4 (23 Feb 2022 07:50)  HR: 88 (23 Feb 2022 11:30) (84 - 107)  BP: 112/57 (23 Feb 2022 11:30) (97/51 - 112/57)  BP(mean): --  RR: 20 (23 Feb 2022 11:30) (20 - 20)  SpO2: 99% (23 Feb 2022 11:30) (95% - 99%)    Discharge Physical Exam:  Constitutional: No acute distress, well appearing, alert and active, Nonverbal.   Eyes: PERRLA, no conjunctival injection, no eye discharge, EOMI  Respiratory: Not in respiratory distress.   Cardiovascular: Regular rate.   Gastrointestinal: nondistended  Skin: No rash  Neuro: Full ROM in upper and lower extremities.     Discharge plan:  F/U with Primary doctor Dr. Gonzalez in 1-3 days  F/U with Neurologist Dr. Talamantes in 2 weeks.   Take Keppra 250 mg every 12 hours

## 2022-02-23 NOTE — PRE-ANESTHESIA EVALUATION PEDIATRIC - NSANTHHPIFT_GEN_P_CORE
pt had a seizure in 2019, and had another one  on this admission     pt has a history of autism pt had a seizure in 2019, and had another one  on this admission     pt has a history of autism and is cooperative   has an IV in place that flushes well

## 2022-02-23 NOTE — DISCHARGE NOTE PROVIDER - NSDCCPCAREPLAN_GEN_ALL_CORE_FT
PRINCIPAL DISCHARGE DIAGNOSIS  Diagnosis: Seizure  Assessment and Plan of Treatment: Discharge plan:  F/U with Primary doctor Dr. Gonzalez in 1-3 days  F/U with Neurologist Dr. Talamantes in 2 weeks.   Take Keppra 250 mg every 12 hours.  -Please seek medical attention if seizure lasts > 2 minutes, loss of consciousness, altered mental status, persistent headache or lethargy, change in seizure activity or any new or worsening medical conditions  -Activities such as swimming, bathing, outdoor activities, and sports should be done under supervision

## 2022-02-23 NOTE — PROGRESS NOTE PEDS - ASSESSMENT
21 year old ASD with second unprovoked seizure. VEEG remains normal but does not rule out diagnosis of Epilepsy. Findings discussed with Mom at bedside and Dad via telephone.    1. Discontinue VEEG  2. MRI Brain scheduled today under sedation  3. Will start Keppra 250 mg BID. I reviewed process of adjusting dose for weight as outpatient. I also reviewed potential side effects and likely duration of treatment.  4. If MRI is normal, clear to discharge home today. Family will likely follow up with Dr. Talamantes, Neurologist at [facility]
21 year old Autism Spectrum Disorder admitted for work-up second unprovoked seizure. EEG findings reviewed with Mom at bedside and with Dad via telephone. I discussed his risk factors for seizure. As this is his second seizure I discussed initiation of antiepileptic medication.     1. Will continue VEEG one more night in order to try to capture abnormality that will better localize his seizures as this can aid in choice of medication management. If VEEG remains normal overnight I discussed plan to start Keppra prior to discharge home tomorrow.  2. I discussed possible MRI brain as part of seizure work up. He will require sedation for this test. They will discuss with his neurologist Dr. Talamantes whether to complete that here or at [facility]
21 year old M with ASD and 1 prior seizure episode in May 2019, presenting with tonic clonic seizure-like activity, admitted for VEEG to rule out seizures. Overnight VEEG showed no seizure activity and was normal. We will continue VEEG for another night to assess for any further seizure activity. If no activity is seen, Keppra will be started. Possible MRI brain with sedation to be done after VEEG is taken off.     Plan:   RESP:  RA    CVS:  Stable    FENGI:  Regular diet  IV lock and flush    ID:  COVID neg    NEURO:  VEEG  Ativan 2 mg IV PRN for seizures > 5 min  seizure precautions  
Assessment:  21 year old M with ASD and 1 prior seizure episode in May 2019, presenting with tonic clonic seizure-like activity, admitted for VEEG to rule out seizures.   Patient is currently on vEEG. No AEDs recommended by neurology. Will continue to monitor vitals and discuss vEEG results in AM.    Plan:  RESP:  RA    CVS:  Stable    FENGI:  Regular diet  IV lock and flush    ID:  COVID neg    NEURO:  VEEG  Ativan 2 mg IV PRN for seizures > 5 min  seizure precautions

## 2022-02-23 NOTE — PROGRESS NOTE PEDS - SUBJECTIVE AND OBJECTIVE BOX
[id number]  STEWART ARGUELLO  21y    Male    Allergies: No Known Allergies      Medications: dextrose 5% + sodium chloride 0.9%. - Pediatric 1000 milliLiter(s) IV Continuous <Continuous>  LORazepam IV Push - Peds 2 milliGRAM(s) IV Push once PRN      T(C): 36.9 (02-23-22 @ 07:50), Max: 36.9 (02-23-22 @ 07:50)  HR: 93 (02-23-22 @ 07:50) (84 - 107)  BP: 106/55 (02-23-22 @ 07:50) (97/51 - 123/76)  RR: 20 (02-23-22 @ 07:50) (20 - 20)  SpO2: 98% (02-23-22 @ 07:50) (95% - 100%)    No events overnight. Slept comfortably  VEEG remains normal    PHYSICAL EXAM:    Awake, in NAD.   Neurological: CN II-XII in tact. No nystagmus. Motor full strength x4.

## 2022-02-23 NOTE — CHART NOTE - NSCHARTNOTEFT_GEN_A_CORE
pt tolerated procedure well   tx to pacu  vss  report given   no apparent complications of anesthesia

## 2022-02-23 NOTE — DISCHARGE NOTE PROVIDER - CARE PROVIDER_API CALL
TIM TRISTAN  Psychiatry & Neurology  [address] [address]  Phone: [phone]  Fax: [phone])-  Follow Up Time: 2 weeks    Ned Gonzalez)  Pediatrics  [address] [place] [id number]  Phone: [phone]  Fax: [phone]  Follow Up Time: 1-3 days

## 2022-02-27 DIAGNOSIS — R56.9 UNSPECIFIED CONVULSIONS: ICD-10-CM

## 2022-02-27 DIAGNOSIS — G40.409 OTHER GENERALIZED EPILEPSY AND EPILEPTIC SYNDROMES, NOT INTRACTABLE, WITHOUT STATUS EPILEPTICUS: ICD-10-CM

## 2022-02-27 DIAGNOSIS — D72.829 ELEVATED WHITE BLOOD CELL COUNT, UNSPECIFIED: ICD-10-CM

## 2022-02-27 DIAGNOSIS — F84.0 AUTISTIC DISORDER: ICD-10-CM

## 2022-10-13 ENCOUNTER — OUTPATIENT (OUTPATIENT)
Dept: OUTPATIENT SERVICES | Facility: HOSPITAL | Age: 22
LOS: 1 days | Discharge: HOME | End: 2022-10-13

## 2023-01-05 ENCOUNTER — OUTPATIENT (OUTPATIENT)
Dept: OUTPATIENT SERVICES | Facility: HOSPITAL | Age: 23
LOS: 1 days | Discharge: HOME | End: 2023-01-05

## 2023-05-04 NOTE — PATIENT PROFILE PEDIATRIC - URINARY CATHETER
Ochoa Nika called to request a refill on her medication.       Last office visit : 12/27/2022   Next office visit : 6/27/2023     Requested Prescriptions     Pending Prescriptions Disp Refills    dicyclomine (BENTYL) 10 MG capsule [Pharmacy Med Name: DICYCLOMINE 10 MG CAPSULE] 270 capsule 1     Sig: TAKE 1 CAPSULE BY MOUTH 3 TIMES DAILY AS NEEDED (ABDOMINAL CRAMPING)            Julian Cruz LPN
no

## 2024-10-10 NOTE — ED PROVIDER NOTE - NS ED MD EM SELECTION
Can you clarify with them if patient would need pretreatment or if they can do the test at all?   [id number] [facility]

## 2025-05-08 NOTE — PATIENT PROFILE PEDIATRIC - PRO PAIN NONVERBAL INDICATE PEDS
LVM for patient to call back to be scheduled for Pre  Op appt. with Dr. Fishman. Surgery date 6/5.   zoya